# Patient Record
Sex: MALE | Race: BLACK OR AFRICAN AMERICAN
[De-identification: names, ages, dates, MRNs, and addresses within clinical notes are randomized per-mention and may not be internally consistent; named-entity substitution may affect disease eponyms.]

---

## 2019-06-10 ENCOUNTER — HOSPITAL ENCOUNTER (OUTPATIENT)
Dept: HOSPITAL 72 - GAS | Age: 59
Discharge: HOME | End: 2019-06-10
Payer: COMMERCIAL

## 2019-06-10 VITALS — BODY MASS INDEX: 31.05 KG/M2 | HEIGHT: 76 IN | WEIGHT: 255 LBS

## 2019-06-10 VITALS — DIASTOLIC BLOOD PRESSURE: 69 MMHG | SYSTOLIC BLOOD PRESSURE: 117 MMHG

## 2019-06-10 VITALS — SYSTOLIC BLOOD PRESSURE: 109 MMHG | DIASTOLIC BLOOD PRESSURE: 76 MMHG

## 2019-06-10 VITALS — DIASTOLIC BLOOD PRESSURE: 73 MMHG | SYSTOLIC BLOOD PRESSURE: 108 MMHG

## 2019-06-10 VITALS — DIASTOLIC BLOOD PRESSURE: 83 MMHG | SYSTOLIC BLOOD PRESSURE: 119 MMHG

## 2019-06-10 VITALS — DIASTOLIC BLOOD PRESSURE: 75 MMHG | SYSTOLIC BLOOD PRESSURE: 116 MMHG

## 2019-06-10 VITALS — DIASTOLIC BLOOD PRESSURE: 66 MMHG | SYSTOLIC BLOOD PRESSURE: 111 MMHG

## 2019-06-10 VITALS — SYSTOLIC BLOOD PRESSURE: 123 MMHG | DIASTOLIC BLOOD PRESSURE: 67 MMHG

## 2019-06-10 DIAGNOSIS — D12.3: ICD-10-CM

## 2019-06-10 DIAGNOSIS — K64.8: ICD-10-CM

## 2019-06-10 DIAGNOSIS — E66.3: ICD-10-CM

## 2019-06-10 DIAGNOSIS — K57.90: ICD-10-CM

## 2019-06-10 DIAGNOSIS — K63.5: ICD-10-CM

## 2019-06-10 DIAGNOSIS — K21.9: ICD-10-CM

## 2019-06-10 DIAGNOSIS — D12.4: ICD-10-CM

## 2019-06-10 DIAGNOSIS — Z12.11: Primary | ICD-10-CM

## 2019-06-10 PROCEDURE — 94150 VITAL CAPACITY TEST: CPT

## 2019-06-10 PROCEDURE — 45380 COLONOSCOPY AND BIOPSY: CPT

## 2019-06-10 PROCEDURE — 93005 ELECTROCARDIOGRAM TRACING: CPT

## 2019-06-10 PROCEDURE — 94003 VENT MGMT INPAT SUBQ DAY: CPT

## 2019-06-10 NOTE — SHORT STAY SURGERY H&P
History of Present Illness


History of Present Illness


Chief Complaint


screening colon


HPI


Thais Strickland is a 59 year old male who was admitted on  for Colon Screening





Patient History


Allergies:  


Uncoded Allergies:  


     NKDA (Allergy, Unknown, 6/10/19)





Review of Systems


Cardiovascular:  Reports: no symptoms


Respiratory:  Reports: no symptoms


Skeletal:  Reports: no symptoms


Gastrointestinal:  Reports: no symptoms


Genitourinary:  Reports: no symptoms


Neurologic:  Reports: no symptoms


Endocrine:  Reports: no symptoms


Hematologic:  Reports: no symptoms





Physical Exam


Vital Signs





Last Vital Signs








  Date Time  Temp Pulse Resp B/P (MAP) Pulse Ox O2 Delivery O2 Flow Rate FiO2


 


6/10/19 07:02      Room Air  


 


6/10/19 06:58 97.5 63 18 109/76 96   








Skin:  normal


HENT:  normal


Heart:  normal


Lungs:  normal


Abdomen:  normal


Extremities:  normal





Plan


Plan of Care


colonoscopy


Attestation


Are the patient's medical conditions optimized for surgery?


Attestation Response:  yes











Gil Enriquez MD Ken 10, 2019 09:32

## 2019-06-10 NOTE — PROCEDURE NOTE
DATE OF PROCEDURE:  06/10/2019

SURGEON:  Gil Enriquez M.D.



PROCEDURE:  Colonoscopy with biopsy.



ANESTHESIA:  Per Dr. Medina.



INSTRUMENT:  Olympus adult flexible colonoscope.



INDICATION:  Screening colonoscopy.



REASON FOR PROCEDURE:  The procedure, risks, benefits, and possible

consequences, including hemorrhage, aspiration, perforation and infection,

and alternative treatments, were explained to the patient/legal guardian

by Dr. Gil Enriquez and the patient/legal guardian understood and

accepted these risks.



PROCEDURE IN DETAIL:  After informed consent was obtained and the patient

was adequately sedated, first rectal exam was performed, which was normal.

Then, the scope was advanced from the rectum into the cecum and

subsequently into the terminal ileum.  Quality of prep was very good.



The patient had evidence of scattered diverticulosis in the left

colon.



The patient had evidence of a total of five polyps, three in transverse,

one in descending, and one in sigmoid.  All less than 1 cm.  All removed

with the cold biopsy forceps technique.



Retroflexion of rectum was performed, which showed evidence of small,

but nonbleeding internal hemorrhoids.



SUMMARY OF FINDINGS:

1. Total of five polyps removed.  See above for details.

2. Diverticulosis.

3. Internal hemorrhoids.



RECOMMENDATIONS:

1. Follow up pathology.

2. Recommend repeat colonoscopy in three years given five polyps.









  ______________________________________________

  Gil Enriquez M.D.





DR:  ROCKY

D:  06/10/2019 09:35

T:  06/10/2019 17:03

JOB#:  [id number]

CC:

## 2019-06-10 NOTE — SHORT STAY SURGERY H&P
History of Present Illness


History of Present Illness


Chief Complaint


see recent office note


HPI


Thais Strickland is a 59 year old male who was admitted on  for Colon Screening





Patient History


Allergies:  


Uncoded Allergies:  


     NKDA (Allergy, Unknown, 6/10/19)





Physical Exam


Vital Signs





Last Vital Signs








  Date Time  Temp Pulse Resp B/P (MAP) Pulse Ox O2 Delivery O2 Flow Rate FiO2


 


6/10/19 07:02      Room Air  


 


6/10/19 06:58 97.5 63 18 109/76 96   











Plan


Attestation


Are the patient's medical conditions optimized for surgery?











Gil Enriquez MD Ken 10, 2019 09:11

## 2019-06-10 NOTE — IMMEDIATE POST-OP EVALUATION
Immediate Post-Op Evalulation


Immediate Post-Op Evalulation


Procedure:  Colonoscopy polypectomy


Date of Evaluation:  Ken 10, 2019


Time of Evaluation:  09:39


IV Fluids:  700


Blood Products:  none


Estimated Blood Loss:  min


Urinary Output:  none


Blood Pressure Systolic:  121


Blood Pressure Diastolic:  65


Pulse Rate:  60


Respiratory Rate:  20


O2 Sat by Pulse Oximetry:  98


Temperature (Fahrenheit):  97.7


Pain Score (1-10):  1


Nausea:  No


Vomiting:  No


Complications


none


Patient Status:  reacts, patent, none


Hydration Status:  adequate











Vincenzo Medina MD Ken 10, 2019 09:40

## 2019-06-10 NOTE — ANETHESIA PREOPERATIVE EVAL
Anesthesia Pre-op PMH/ROS


General


Date of Evaluation:  Ken 10, 2019


Time of Evaluation:  08:25


Anesthesiologist:  Carol


ASA Score:  ASA 2


Mallampati Score


Class I : Soft palate, uvula, fauces, pillars visible


Class II: Soft palate, uvula, fauces visible


Class III: Soft palate, base of uvula visible


Class IV: Only hard plate visible


Mallampati Classification:  Class II


Surgeon:  Zoe


Diagnosis:  Colon CA screening


Surgical Procedure:  Colonoscopy


Anesthesia History:  none


Family History:  no anesthesia problems


Medications:  see eMAR


Patient NPO?:  Yes





Past Medical History


Cardiovascular:  Denies: HTN, CAD, MI, valve dz, arrhythmia, other


Pulmonary:  Denies: asthma, COPD, ABRAHAN, other


Gastrointestinal/Genitourinary:  Reports: GERD - mild


Neurologic/Psychiatric:  Denies: dementia, CVA, depression/anxiety, TIA, other


Endocrine:  Denies: DM, hypothyroidism, steroids, other


HEENT:  Denies: cataract (L), cataract (R), glaucoma, Douglas (L), Douglas (R), other


Hematology/Immune:  Denies: anemia, DVT, bleeding disorder, other


Musculoskeletal/Integumentary:  Denies: OA, RA, DJD, DDD, edema, other


Other:  other - overweight


PMH Narrative:


as above


PSxH Narrative:


see H&P





Anesthesia Pre-op Phys. Exam


Physician Exam





Last Vital Signs








  Date Time  Temp Pulse Resp B/P (MAP) Pulse Ox O2 Delivery O2 Flow Rate FiO2


 


6/10/19 07:02      Room Air  


 


6/10/19 06:58 97.5 63 18 109/76 96   








Constitutional:  NAD


Neurologic:  CN 2-12 intact


Cardiovascular:  RRR, no M/R/G


Respiratory:  CTA


Gastrointestinal:  S/NT/ND





Airway Exam


Mallampati Score:  Class II


MO:  full


ROM:  full


Teeth:  intact


Dentures:  no upper, no lower





Anesthesia Pre-op A/P


Labs


see chart





Risk Assessment & Plan


Assessment:


ASA 2


Plan:


MAC


Status Change Before Surgery:  Vincenzo Cummings MD Ken 10, 2019 08:28

## 2019-06-10 NOTE — ENDOSCOPY PROCEDURE NOTE
Endoscopy Procedure Note


General


Indication for Procedure:  screening


Procedures Performed:  colonoscopy


Operative Findings/Diagnosis:  5 polyps


Specimen:  yes


Pt Tolerated Procedure Well:  Yes


Estimated Blood Loss:  none





Anesthesia


Anesthesiologist:  ty


Anesthesia:  MAC





Inserted Devices


Implant(s) used?:  No





Quality


Quality of Bowel Preparation:  Good


Did scope reach the cecum?:  Yes


Was there any complications?:  No





GI Core Measures


50 yrs or older w/o bx or poly:  No


10yrs. F/U recommended:  Yes


If not recommended, why?:  Above average risk


18 years or older w/prev. colo:  No











Gil Enriquez MD Ken 10, 2019 09:35

## 2019-06-10 NOTE — PRE-PROCEDURE NOTE/ATTESTATION
Pre-Procedure Note/Attestation


Complete Prior to Procedure


Planned Procedure:  not applicable


Procedure Narrative:


colonoscopy





Indications for Procedure


Pre-Operative Diagnosis:


screening





Attestation


I attest that I discussed the nature of the procedure; its benefits; risks and 

complications; and alternatives (and the risks and benefits of such alternatives

), prior to the procedure, with the patient (or the patient's legal 

representative).





I attest that, if there was a reasonable possibility of needing a blood 

transfusion, the patient (or the patient's legal representative) was given the 

[facility] standardized written summary, pursuant 

to the Timothy Corina Blood Safety Act ([place] Health and Safety Code # 1645, as 

amended).





I attest that I re-evaluated the patient just prior to the surgery and that 

there has been no change in the patient's H&P, except as documented below:











Gil Enriquez MD Ken 10, 2019 09:10

## 2019-06-10 NOTE — 48 HOUR POST ANESTHESIA EVAL
Post Anesthesia Evaluation


Procedure:  Colonoscopy polypectomy


Date of Evaluation:  Ken 10, 2019


Time of Evaluation:  10:18


Blood Pressure Systolic:  116


0:  62


Pulse Rate:  68


Respiratory Rate:  20


Temperature (Fahrenheit):  97.6


O2 Sat by Pulse Oximetry:  98


Airway:  patent


Nausea:  No


Vomiting:  No


Pain Intensity:  1


Hydration Status:  adequate


Cardiopulmonary Status:


stable


Mental Status/LOC:  patient returned to baseline


Follow-up Care/Observations:


n/a


Post-Anesthesia Complications:


none


Follow-up care needed:  ready to discharge











Vincenzo Medina MD Ken 10, 2019 10:19

## 2019-06-12 NOTE — CARDIOLOGY REPORT
--------------- APPROVED REPORT --------------





EKG Measurement

Heart [id number]

FL [id number]

[id number]

[id number]

[id number]





Sinus bradycardia

Otherwise normal ECG